# Patient Record
Sex: MALE | Race: WHITE | URBAN - METROPOLITAN AREA
[De-identification: names, ages, dates, MRNs, and addresses within clinical notes are randomized per-mention and may not be internally consistent; named-entity substitution may affect disease eponyms.]

---

## 2024-08-08 ENCOUNTER — HOME HEALTH ADMISSION (OUTPATIENT)
Age: 59
End: 2024-08-08
Payer: COMMERCIAL

## 2024-08-09 ENCOUNTER — HOME CARE VISIT (OUTPATIENT)
Age: 59
End: 2024-08-09
Payer: COMMERCIAL

## 2024-08-09 VITALS
DIASTOLIC BLOOD PRESSURE: 80 MMHG | TEMPERATURE: 97.6 F | HEART RATE: 91 BPM | SYSTOLIC BLOOD PRESSURE: 110 MMHG | OXYGEN SATURATION: 98 % | RESPIRATION RATE: 18 BRPM

## 2024-08-09 PROCEDURE — G0151 HHCP-SERV OF PT,EA 15 MIN: HCPCS

## 2024-08-09 ASSESSMENT — ENCOUNTER SYMPTOMS
DYSPNEA ACTIVITY LEVEL: AFTER AMBULATING MORE THAN 20 FT
PAIN LOCATION - PAIN QUALITY: THROBBING

## 2024-08-09 NOTE — HOME HEALTH
Skilled services/Home bound verification:     Skilled Reason for admission/summary of clinical condition:  L TKR  This patient is homebound for the following reasons Requires considerable and taxing effort to leave the home .    Caregiver: spouse  Caregiver assists with IADL's.    Medications reconciled and all medications are available in the home this visit.        High risk medication teaching regarding anticoagulants, antiplatelets, antibiotics, antipsychotics, hypoglycemic agents, or opioid/narcotics performed (specify): oxycodone and xarelto     Dr. Remi Cain notified of any discrepancies/look a like medications/medication interactions n/a.     Home health supplies by type and quantity ordered/delivered this visit include: none     Pt/Caregiver instructed on plan of care and are agreeable to plan of care at this time.      Physician Dr. Remi Cain notified of patient admission to home health and plan of care including anticipated frequency of 1w1 2w1 1w1 for PT     Discharge planning discussed with patient and caregiver.  Discharge planning as follows: dc when all goals are met .  Pt/Caregiver did verbalize understanding of discharge planning.       PMHx:     Adverse effect of anesthesia   difficulty with going to sleep and waking up   Arthropathy, unspecified, site unspecified   right knee, left foot   Asthma   uses inhalers   COVID 04/2022   Esophageal reflux   Ex-cigarette smoker   Exercise tolerance finding 08/2022   up 2 flights of stairs no cp, no sob   Exercise tolerance finding 08/01/2024   up 2 flights no cp or sob   H/O joint replacement   Kidney stones   Loose, teeth 12/2014   front 2 teeth-- nuvaseal   Nausea with vomiting   post op   Obesity   lap banding--   Piercing   ear   PONV (postoperative nausea and vomiting)   Sleep apnea   does not tolerate C-PAP   Tattoo   back x3, arms x4          SUBJECTIVE: pain on L knee, difficulty with walking, stiffness on LLE   LIVING SITUATION/PLOF: Lives

## 2024-08-12 ENCOUNTER — HOME CARE VISIT (OUTPATIENT)
Age: 59
End: 2024-08-12
Payer: COMMERCIAL

## 2024-08-12 VITALS
DIASTOLIC BLOOD PRESSURE: 78 MMHG | SYSTOLIC BLOOD PRESSURE: 130 MMHG | OXYGEN SATURATION: 95 % | TEMPERATURE: 96.5 F | HEART RATE: 93 BPM

## 2024-08-12 PROCEDURE — G0151 HHCP-SERV OF PT,EA 15 MIN: HCPCS

## 2024-08-12 NOTE — HOME HEALTH
SUBJECTIVE: I am having increased  bruisng and swelling my L hamstrings and quads are just very tight. Pt noted that they called him in Robaxin this weekend   REQUIRES CAREGIVER ASSISTANCE FOR: transportation, Medications< ADLS, IADLS   MEDICATIONS REVIEWED AND RECONCILED  Robaxin 500 mg every 12 Hrs  NEXT MD APPT:  8/28/24  ROM:L knee in sitting -8-75 degrees  STRENGTH: L LE AP,QS,  HS, Hamstrings sets, L knee flexion on step, toe raises, min squats  x 10  WOUNDS:L knee bandage clean dry and intact. No signs or symptoms of infection noted  Brusing noted on back of L upper and lower leg  BED MOBILITY:sit to supine with MIN A for  L LE managment. Supine to sit pt used contralaterl leg A and required S for L LE managment  TRANSFERS:sit to stand from aleksey x1 and chair x 2 with SBA with B UE support for push off. Pt does extend L LE fwd on transfers  GAIT: Pt amb 35 ft x 2 with RW with recipical gait patern with SBA decreaed B step length noted. B feet do clear the floor during swing phase of gait. Decreaed L knee flexion noted during swing phase of gait. Slow cr   STAIRS:NA  PATIENT RESPONE TO TX: Pt pain level remained the same throughout tx session   PATIENT LEVEL OF UNDERSTANDING OF EDUCATION PROVIDED Pt educated to use RW at all times when amb for safety   PATIENT EDUCATION PROVIDED THIS VISIT: safety, HEP, walking, deep breathing, Educated pt to use RW at all times when amb for safety. Educated pt to elevate and ice LLE throughout the day 20 min on 1Hr off. During waking hrs with a barrier between skin and Ice to protect skin. Educated pt to change position every HR for pressure relief. Perfromed trigger pont release to L hamstrings x 5 min. Educated pt to use moist heat for 15 min on L upper hamstrings,then perfrom therex and than ice to see if that will help with the pain and tightness       HEP consisting of:  1. Walking every hour during the day with RW  2. supine therex L LE AP,QS, knee flexion

## 2024-08-15 ENCOUNTER — HOME CARE VISIT (OUTPATIENT)
Age: 59
End: 2024-08-15
Payer: COMMERCIAL

## 2024-08-15 VITALS
TEMPERATURE: 97.3 F | OXYGEN SATURATION: 87 % | SYSTOLIC BLOOD PRESSURE: 120 MMHG | HEART RATE: 87 BPM | DIASTOLIC BLOOD PRESSURE: 70 MMHG

## 2024-08-15 PROCEDURE — G0151 HHCP-SERV OF PT,EA 15 MIN: HCPCS

## 2024-08-15 NOTE — HOME HEALTH
SUBJECTIVE: Pt reportst that he is not as tight as he was the last time that I saw him   REQUIRES CAREGIVER ASSISTANCE FOR: transportation, medications, IADLS   MEDICATIONS REVIEWED AND RECONCILED  no changes  NEXT MD APPT:  8//2224  ROM:L knee in sitting  0-78 degrees  STRENGTH: on step L knee flexion x 10, tap up L LE on step x 10, L hamstring stretching x 10, L hamstrings stretching using a leg  x10   Knee flexion with a bag   WOUNDS:L knee bandge clean dry and intact. Increased swelling noted L LE  increased brusing noted B LE in medial thigh L LE   BED MOBILITY:sit to supine with contra lateral leg A MOD I. supineto sit MOD I  TRANSFERS:sit to stand from low couch with B UE support for push off with MOD I, sit to stand from bed and chair x 4 MOD I. MOD vc needed not to extend L LE fwd on transfers   GAIT: Pt amb 100 ft x 2 with RW with reciprocal gait pattern with S. MOD vc needed for increased L knee flexion noted during swing phase of gait as pt holds it very stiff, vc needed for increased L HS at iniatl contat   STAIRS: Pt went up and down 4 steps x2 with 1 HR and SPC with step too gait pattern with SBA with 1 vc needed for proper sequecing gait   PATIENT RESPONE TO TX: Pt pain level remained the same throughout tx session  PATIENT LEVEL OF UNDERSTANDING OF EDUCATION PROVIDED Cont to use RW at all times when amb for safety  PATIENT EDUCATION PROVIDED THIS VISIT: safety, HEP, walking, deep breathing,. Educated pt to elevate and ice L LE throughout the day 20 min on 1Hr off. During waking hrs with a barrier between skin and Ice to protect skin. Educated pt to change position every HR for pressure relief.    HEP consisting of:  1. Walking every hour during the day with RW  2. 1. Walking every hour during the day with R  2. supine therex L LE AP,QS, knee flexion on step, toe raises, MIn squads 3 daily hamstring stetching, Knee flexion with bag. x 10  Written HEP issued, patient/caregiver

## 2024-08-19 ENCOUNTER — HOME CARE VISIT (OUTPATIENT)
Age: 59
End: 2024-08-19
Payer: COMMERCIAL

## 2024-08-19 VITALS
TEMPERATURE: 97.3 F | OXYGEN SATURATION: 97 % | HEART RATE: 90 BPM | SYSTOLIC BLOOD PRESSURE: 140 MMHG | DIASTOLIC BLOOD PRESSURE: 68 MMHG

## 2024-08-19 PROCEDURE — G0151 HHCP-SERV OF PT,EA 15 MIN: HCPCS

## 2024-08-19 ASSESSMENT — ENCOUNTER SYMPTOMS: PAIN LOCATION - PAIN QUALITY: DULL ACHING

## 2024-08-19 NOTE — HOME HEALTH
Today at MD bed mobility is supine<>sit MOD I .  On SOC transfers were  Jazmin with verbal cues to and from bed, chair and toilet transfers using RW. Today at MD transfers are :car transfers MOD I. sit to stand from chair and couch with B UE support MOD I. Pt still cont to extend L LE fwd on transfers On [facility] gait was Patient ambulated with RW from living room to bedroom and bathroom, gait characterized by antalgic gait, decreased step height and stride length on LLE with wide WILLAM and slow paced. Patient is unsafe with gait without AD . Today at MD gait is Pt amb 150 ft on flat level and uneven surfaces with SPC with reciprocal gait pattern MOD I decreased L knee flexion noted durnig swing phase of gait decreased L DF at iniital contact. On [facility] stairs are <6 steps to enter and exit house and needed Min A. Today on MD stairs are :Pt went up and down 4 steps with 1 HR and SPC with step too gait patten MOD I. On [facility]  Tinetti 11/28 high fall risk due to reduced strength on LLE, gait deviation and decreased efficiency of balance reactions. Patient demonstrated poor use of hip and ankle strategy during standing balance activity. Patient requires support from AD at all times for safety and stability..  Today at MD pt scored 24/28 on Tinetti Balance Assessment placing pt as a med  fall risk..  Pt did have more than a 4 point statistical improvement. Pt has made progress and has met all goals.  Discharge plan: Discharge from Home Health services as all goals have been met.  Dr. Cain,  office has been notified of DC from [facility]  with goals met. Pt will follow up with outpatient PT of his choice.